# Patient Record
Sex: FEMALE | URBAN - METROPOLITAN AREA
[De-identification: names, ages, dates, MRNs, and addresses within clinical notes are randomized per-mention and may not be internally consistent; named-entity substitution may affect disease eponyms.]

---

## 2023-02-10 ENCOUNTER — TELEPHONE (OUTPATIENT)
Dept: OTHER | Facility: OTHER | Age: 48
End: 2023-02-10

## 2023-02-10 NOTE — TELEPHONE ENCOUNTER
Patient is requesting a call back from the office to schedule a colonoscopy but she is a [place] patient and not sure if she should be calling [facility]

## 2023-02-10 NOTE — TELEPHONE ENCOUNTER
Left message for patient to call back and schedule colonoscopy at [facility]